# Patient Record
Sex: MALE | Race: BLACK OR AFRICAN AMERICAN | URBAN - METROPOLITAN AREA
[De-identification: names, ages, dates, MRNs, and addresses within clinical notes are randomized per-mention and may not be internally consistent; named-entity substitution may affect disease eponyms.]

---

## 2019-05-09 ENCOUNTER — HISTORICAL (OUTPATIENT)
Dept: CARDIOLOGY | Facility: HOSPITAL | Age: 79
End: 2019-05-09

## 2019-05-09 LAB
ABS NEUT (OLG): 4.34 X10(3)/MCL (ref 2.1–9.2)
BASOPHILS # BLD AUTO: 0.1 X10(3)/MCL (ref 0–0.2)
BASOPHILS NFR BLD AUTO: 1 %
BNP BLD-MCNC: 164 PG/ML (ref 0–125)
BUN SERPL-MCNC: 19 MG/DL (ref 7–18)
CALCIUM SERPL-MCNC: 9.4 MG/DL (ref 8.5–10.1)
CHLORIDE SERPL-SCNC: 106 MMOL/L (ref 98–107)
CO2 SERPL-SCNC: 29 MMOL/L (ref 21–32)
CREAT SERPL-MCNC: 1.27 MG/DL (ref 0.7–1.3)
CREAT/UREA NIT SERPL: 15
EOSINOPHIL # BLD AUTO: 0.3 X10(3)/MCL (ref 0–0.9)
EOSINOPHIL NFR BLD AUTO: 4 %
ERYTHROCYTE [DISTWIDTH] IN BLOOD BY AUTOMATED COUNT: 14.6 % (ref 11.5–17)
GLUCOSE SERPL-MCNC: 109 MG/DL (ref 74–106)
HCT VFR BLD AUTO: 39.6 % (ref 42–52)
HGB BLD-MCNC: 11.9 GM/DL (ref 14–18)
LYMPHOCYTES # BLD AUTO: 2.1 X10(3)/MCL (ref 0.6–4.6)
LYMPHOCYTES NFR BLD AUTO: 27 %
MCH RBC QN AUTO: 26.5 PG (ref 27–31)
MCHC RBC AUTO-ENTMCNC: 30.1 GM/DL (ref 33–36)
MCV RBC AUTO: 88.2 FL (ref 80–94)
MONOCYTES # BLD AUTO: 0.9 X10(3)/MCL (ref 0.1–1.3)
MONOCYTES NFR BLD AUTO: 11 %
NEUTROPHILS # BLD AUTO: 4.34 X10(3)/MCL (ref 2.1–9.2)
NEUTROPHILS NFR BLD AUTO: 57 %
PLATELET # BLD AUTO: 255 X10(3)/MCL (ref 130–400)
PMV BLD AUTO: 12.6 FL (ref 9.4–12.4)
POTASSIUM SERPL-SCNC: 3.8 MMOL/L (ref 3.5–5.1)
RBC # BLD AUTO: 4.49 X10(6)/MCL (ref 4.7–6.1)
SODIUM SERPL-SCNC: 140 MMOL/L (ref 136–145)
WBC # SPEC AUTO: 7.7 X10(3)/MCL (ref 4.5–11.5)

## 2019-05-25 ENCOUNTER — HOSPITAL ENCOUNTER (OUTPATIENT)
Dept: MEDSURG UNIT | Facility: HOSPITAL | Age: 79
End: 2019-05-27
Attending: INTERNAL MEDICINE | Admitting: INTERNAL MEDICINE

## 2019-05-25 LAB
ABS NEUT (OLG): 5.53 X10(3)/MCL (ref 2.1–9.2)
ALBUMIN SERPL-MCNC: 3.7 GM/DL (ref 3.4–5)
ALBUMIN/GLOB SERPL: 0.9 RATIO (ref 1.1–2)
ALP SERPL-CCNC: 79 UNIT/L (ref 50–136)
ALT SERPL-CCNC: 21 UNIT/L (ref 12–78)
APPEARANCE, UA: CLEAR
AST SERPL-CCNC: 54 UNIT/L (ref 15–37)
BACTERIA SPEC CULT: ABNORMAL /HPF
BASOPHILS # BLD AUTO: 0 X10(3)/MCL (ref 0–0.2)
BASOPHILS NFR BLD AUTO: 0 %
BILIRUB SERPL-MCNC: 0.6 MG/DL (ref 0.2–1)
BILIRUB UR QL STRIP: NEGATIVE
BILIRUBIN DIRECT+TOT PNL SERPL-MCNC: 0.2 MG/DL (ref 0–0.5)
BILIRUBIN DIRECT+TOT PNL SERPL-MCNC: 0.4 MG/DL (ref 0–0.8)
BUN SERPL-MCNC: 26 MG/DL (ref 7–18)
CALCIUM SERPL-MCNC: 9.5 MG/DL (ref 8.5–10.1)
CHLORIDE SERPL-SCNC: 106 MMOL/L (ref 98–107)
CK SERPL-CCNC: 875 UNIT/L (ref 39–308)
CO2 SERPL-SCNC: 31 MMOL/L (ref 21–32)
COLOR UR: YELLOW
CREAT SERPL-MCNC: 1.77 MG/DL (ref 0.7–1.3)
EOSINOPHIL # BLD AUTO: 0.1 X10(3)/MCL (ref 0–0.9)
EOSINOPHIL NFR BLD AUTO: 1 %
ERYTHROCYTE [DISTWIDTH] IN BLOOD BY AUTOMATED COUNT: 14.7 % (ref 11.5–17)
GLOBULIN SER-MCNC: 4.1 GM/DL (ref 2.4–3.5)
GLUCOSE (UA): NEGATIVE
GLUCOSE SERPL-MCNC: 86 MG/DL (ref 74–106)
HCT VFR BLD AUTO: 39.8 % (ref 42–52)
HGB BLD-MCNC: 12.3 GM/DL (ref 14–18)
HGB UR QL STRIP: NEGATIVE
KETONES UR QL STRIP: ABNORMAL
LEUKOCYTE ESTERASE UR QL STRIP: NEGATIVE
LYMPHOCYTES # BLD AUTO: 1.6 X10(3)/MCL (ref 0.6–4.6)
LYMPHOCYTES NFR BLD AUTO: 19 %
MCH RBC QN AUTO: 26.5 PG (ref 27–31)
MCHC RBC AUTO-ENTMCNC: 30.9 GM/DL (ref 33–36)
MCV RBC AUTO: 85.8 FL (ref 80–94)
MONOCYTES # BLD AUTO: 1.1 X10(3)/MCL (ref 0.1–1.3)
MONOCYTES NFR BLD AUTO: 13 %
NEUTROPHILS # BLD AUTO: 5.53 X10(3)/MCL (ref 2.1–9.2)
NEUTROPHILS NFR BLD AUTO: 66 %
NITRITE UR QL STRIP: NEGATIVE
PH UR STRIP: 5.5 [PH] (ref 5–9)
PLATELET # BLD AUTO: 245 X10(3)/MCL (ref 130–400)
PMV BLD AUTO: 12 FL (ref 9.4–12.4)
POTASSIUM SERPL-SCNC: 3.5 MMOL/L (ref 3.5–5.1)
PROT SERPL-MCNC: 7.8 GM/DL (ref 6.4–8.2)
PROT UR QL STRIP: NEGATIVE
RBC # BLD AUTO: 4.64 X10(6)/MCL (ref 4.7–6.1)
RBC #/AREA URNS HPF: ABNORMAL /[HPF]
SODIUM SERPL-SCNC: 142 MMOL/L (ref 136–145)
SP GR UR STRIP: 1.01 (ref 1–1.03)
SQUAMOUS EPITHELIAL, UA: ABNORMAL
UROBILINOGEN UR STRIP-ACNC: 0.2
WBC # SPEC AUTO: 8.4 X10(3)/MCL (ref 4.5–11.5)
WBC #/AREA URNS HPF: ABNORMAL /HPF

## 2019-05-26 LAB
ALBUMIN SERPL-MCNC: 3.4 GM/DL (ref 3.4–5)
ALBUMIN/GLOB SERPL: 0.9 RATIO (ref 1.1–2)
ALP SERPL-CCNC: 76 UNIT/L (ref 50–136)
ALT SERPL-CCNC: 19 UNIT/L (ref 12–78)
AST SERPL-CCNC: 42 UNIT/L (ref 15–37)
BILIRUB SERPL-MCNC: 0.7 MG/DL (ref 0.2–1)
BILIRUBIN DIRECT+TOT PNL SERPL-MCNC: 0.2 MG/DL (ref 0–0.5)
BILIRUBIN DIRECT+TOT PNL SERPL-MCNC: 0.5 MG/DL (ref 0–0.8)
BUN SERPL-MCNC: 24 MG/DL (ref 7–18)
CALCIUM SERPL-MCNC: 8.5 MG/DL (ref 8.5–10.1)
CHLORIDE SERPL-SCNC: 103 MMOL/L (ref 98–107)
CK SERPL-CCNC: 591 UNIT/L (ref 39–308)
CO2 SERPL-SCNC: 32 MMOL/L (ref 21–32)
CREAT SERPL-MCNC: 1.67 MG/DL (ref 0.7–1.3)
ERYTHROCYTE [DISTWIDTH] IN BLOOD BY AUTOMATED COUNT: 14.8 % (ref 11.5–17)
GLOBULIN SER-MCNC: 3.7 GM/DL (ref 2.4–3.5)
GLUCOSE SERPL-MCNC: 225 MG/DL (ref 74–106)
HCT VFR BLD AUTO: 37.8 % (ref 42–52)
HGB BLD-MCNC: 11.8 GM/DL (ref 14–18)
MCH RBC QN AUTO: 27 PG (ref 27–31)
MCHC RBC AUTO-ENTMCNC: 31.2 GM/DL (ref 33–36)
MCV RBC AUTO: 86.5 FL (ref 80–94)
PLATELET # BLD AUTO: 217 X10(3)/MCL (ref 130–400)
PMV BLD AUTO: 13 FL (ref 9.4–12.4)
POTASSIUM SERPL-SCNC: 3.5 MMOL/L (ref 3.5–5.1)
PROT SERPL-MCNC: 7.1 GM/DL (ref 6.4–8.2)
RBC # BLD AUTO: 4.37 X10(6)/MCL (ref 4.7–6.1)
SODIUM SERPL-SCNC: 140 MMOL/L (ref 136–145)
WBC # SPEC AUTO: 7 X10(3)/MCL (ref 4.5–11.5)

## 2019-05-27 LAB
BUN SERPL-MCNC: 21 MG/DL (ref 7–18)
CALCIUM SERPL-MCNC: 8.6 MG/DL (ref 8.5–10.1)
CHLORIDE SERPL-SCNC: 104 MMOL/L (ref 98–107)
CO2 SERPL-SCNC: 33 MMOL/L (ref 21–32)
CREAT SERPL-MCNC: 1.52 MG/DL (ref 0.7–1.3)
CREAT/UREA NIT SERPL: 13.8
GLUCOSE SERPL-MCNC: 197 MG/DL (ref 74–106)
POTASSIUM SERPL-SCNC: 3.1 MMOL/L (ref 3.5–5.1)
SODIUM SERPL-SCNC: 141 MMOL/L (ref 136–145)

## 2022-04-30 NOTE — H&P
Patient:   Manoj Lopez             MRN: [id number]            FIN: [id number]               Age:   79 years     Sex:  Male     :  1940   Associated Diagnoses:   None   Author:   Lesvia John MD      Basic Information   Source of history:  Self, Family member, Medical record.    Present at bedside:  Medical personnel.    Referral source:  Emergency department.    History limitation:  None.    Provider information/ cc:    PCP: FAM CHENEY  ADVANCED DIRECTIVES :NONE  CODE STATUS: FULL.       Chief Complaint   2019 19:14 CDT      states fell today and could not get up/ states was 7 hours before anyone could come help him/ hx of dm, cbg 93 in triage/ denies hitting head, denies blood thinners, states has no pain      History of Present Illness   79-year-old -American male presents to the ED brought in by family status post fall at home in the bathroom with patient down for about 7 hours until family returned home and found him.  Patient denies any loss of consciousness, denies any head injury.  He reports he lost his footing and balance in the bathroom and fell and was unable to get up.  He reports his daughter found him after several hours which she assisted patient up and he presented to the ED for further evaluation.  Patient denies any anticoagulation therapy or blood thinners.  He denies any chest pain or shortness of breath, no reports of nausea, vomiting, diarrhea, fever, chills, or any sick contacts.  Labwork done revealed BUN 26.0, creatinine 1.77; , H&H 12.3/39.8, platelets 245; other indices unremarkable.  CT of the head without contrast revealed chronic ischemic disease without acute intracranial abnormality.  Patient denies any injury or trauma. Patient did have some mild tachycardia on presentation in the ED with heart rate 107 which subsequently trended down.  Patient is admitted to hospital medicine services for further management.  VS /80 pulse 97  respirations 20 temperature 37.3°C O2 saturation 96% on room air      Review of Systems   Except as document, all other systems reviewed and negative      Health Status   Allergies:    Allergic Reactions (Selected)  No Known Allergies   Current medications:  (Selected)   Inpatient Medications  Ordered  Dextrose 50% and Water  (50 mL vial/syringe): 25 mL, 12.5 gm =, form: Injection, IV Push, As Directed PRN for blood glucose, Infuse over: 5 minute(s), first dose 05/26/19 0:27:00 CDT, Unconscious patient: Repeat as ordered per protocol.  Dextrose 50% and Water  (50 mL vial/syringe): 25 mL, 12.5 gm =, form: Injection, IV Push, Once PRN for blood glucose, Infuse over: 5 minute(s), first dose 05/26/19 0:27:00 CDT, Unconscious patient: Look for other source of altered mental status.  Dextrose 50% and Water  (50 mL vial/syringe): 50 mL, 25 gm =, form: Injection, IV Push, As Directed PRN for blood glucose, Infuse over: 5 minute(s), first dose 05/26/19 0:27:00 CDT, Unconscious patient: Repeat as ordered per protocol.  Dextrose 50% in Water intravenous solution: 25 mL, 12.5 gm =, form: Injection, IV Push, As Directed PRN for blood glucose, Infuse over: 5 minute(s), first dose 05/26/19 0:27:00 CDT, Conscious patient.  NS (0.9% Sodium Chloride) Infusion 1,000 mL: 1,000 mL, 1,000 mL, IV, 1,000 mL/hr, start date 05/25/19 20:38:00 CDT  Zofran: 4 mg, form: Injection, IV Push, q4hr PRN for nausea, first dose 05/26/19 0:27:00 CDT, choose first if ordered with other treatments for nausea  acetaminophen: 1,000 mg, form: Tab, Oral, q6hr PRN for pain, first dose 05/26/19 0:27:00 CDT, mild/moderate  glucagon: 1 mg, form: Injection, IM, q10min PRN for blood glucose, first dose 05/26/19 0:27:00 CDT, Conscious Patient with NO IV access available and BG < 45 mg/dl.  glucagon: 1 mg, form: Injection, IM, q10min PRN for blood glucose, first dose 05/26/19 0:27:00 CDT, Unconscious patient: Patient with NO IV access available and BG < 70  mg/dl.  insulin lispro: 2-14 units, form: Soln, Subcutaneous, As Directed PRN for blood glucose, first dose 05/26/19 0:27:00 CDT  Documented Medications  Documented  Entresto 49 mg-51 mg oral tablet: 1 tab(s), Oral, BID, # 60 tab(s), 0 Refill(s)  NovoLIN 70/30 FlexPen subcutaneous suspension: Subcutaneous, BID, 39 units in AM and 56 units in PM  Proscar 5 mg oral tablet: 5 mg = 1 tab(s), Oral, Daily, # 30 tab(s), 0 Refill(s)  Travatan Z 0.004% ophthalmic solution: 1 drop(s), Eye-Both, BID  aspirin 325 mg oral Delayed Release (EC) tablet: 325 mg = 1 tab(s), Oral, Daily, # 30 tab(s), 0 Refill(s)  aspirin 81 mg oral Delayed Release (EC) tablet: 81 mg = 1 tab(s), Oral, Daily, # 30 tab(s), 0 Refill(s)  carvedilol 6.25 mg oral tablet: 6.25 mg = 1 tab(s), Oral, BID  furosemide 40 mg oral tablet: 40 mg = 1 tab(s), Oral, Daily  hydrochlorothiazide 25 mg oral tablet: 25 mg = 1 tab(s), Oral, Daily  lisinopril 20 mg oral tablet: 20 mg = 1 tab(s), Oral, Daily  metFORMIN 1000 mg oral tablet: 1000 mg = 1 tab(s), Oral, BID  potassium chloride 10 mEq oral CAPSULE extended release: 10 mEq = 1 cap(s), Oral, Daily  rosuvastatin 10 mg oral tablet: 10 mg = 1 tab(s), Oral, qPM  torsemide 10 mg oral tablet: 10 mg = 1 tab(s), Oral, Daily  torsemide 20 mg oral tablet: 40 mg = 2 tab(s), Oral, qAM      Histories   Past Medical History:   DM, HTN, HLD, glaucoma, CHF, cardiomyopathy, CHANDRIKA   Family History:   Mother- cardiac history   Procedure history: Angiogram   Social History     Denies any alcohol use  Denies any illicit drug use  Denies any tobacco use  Lives with family.        Physical Examination      Vital Signs (last 24 hrs)_____  Last Charted___________  Temp Oral     37.3 DegC  (MAY 26 00:51)  Heart Rate Peripheral   97 bpm  (MAY 26 00:51)  Resp Rate         20 br/min  (MAY 25 23:31)  SBP      H 149mmHg  (MAY 26 00:51)  DBP      80 mmHg  (MAY 26 00:51)  SpO2      96 %  (MAY 26 00:51)   General:  Alert and oriented, No acute distress,  Elderly male chronically ill-appearing; nontoxic.    Eye:  Pupils are equal, round and reactive to light, Extraocular movements are intact, Vision unchanged.    HENT:  Normocephalic, Oral mucosa is moist, No pharyngeal erythema.    Neck:  Supple, Non-tender.    Respiratory:  Lungs are clear to auscultation, Respirations are non-labored, Breath sounds are equal, Symmetrical chest wall expansion.    Cardiovascular:  Normal rate, Regular rhythm, S1, S2, No gallop, Edema lower extremities, systolic murmur.         Capillary refill: Less than 2 seconds.    Gastrointestinal:  Soft, Non-tender, Non-distended, Normal bowel sounds.    Genitourinary:  No costovertebral angle tenderness.    Musculoskeletal:  Moves upper and lower extremities freely and on command; generalized weakness.    Integumentary:  Warm, Dry, Pink.    Neurologic:  Alert, Oriented, Normal sensory, Normal motor function, No focal deficits, Cranial Nerves II-XII are grossly intact.    Psychiatric:  Cooperative, Appropriate mood & affect.    Cognition and Speech:  Speech clear and coherent.       Review / Management   Results review:     Labs (Last four charted values)  WBC                  8.4 (MAY 25)   Hgb                  L 12.3 (MAY 25)   Hct                  L 39.8 (MAY 25)   Plt                  245 (MAY 25)   Na                   142 (MAY 25)   K                    3.5 (MAY 25)   CO2                  31.0 (MAY 25)   Cl                   106 (MAY 25)   Cr                   H 1.77 (MAY 25)   BUN                  H 26.0 (MAY 25)   Glucose Random       86 (MAY 25) .    Laboratory Results   Today's Lab Results : PowerNote Discrete Results   5/25/2019 21:22 CDT      POC CBG                   81 mg/dL    5/25/2019 19:30 CDT      WBC                       8.4 x10(3)/mcL                             RBC                       4.64 x10(6)/mcL  LOW                             Hgb                       12.3 gm/dL  LOW                             Hct                        39.8 %  LOW                             Platelet                  245 x10(3)/mcL                             MCV                       85.8 fL                             MCH                       26.5 pg  LOW                             MCHC                      30.9 gm/dL  LOW                             RDW                       14.7 %                             MPV                       12.0 fL                             Abs Neut                  5.53 x10(3)/mcL                             Neutro Auto               66 %  NA                             Lymph Auto                19 %  NA                             Mono Auto                 13 %  NA                             Eos Auto                  1 %  NA                             Abs Eos                   0.1 x10(3)/mcL                             Basophil Auto             0 %  NA                             Abs Neutro                5.53 x10(3)/mcL                             Abs Lymph                 1.6 x10(3)/mcL                             Abs Mono                  1.1 x10(3)/mcL                             Abs Baso                  0.0 x10(3)/mcL                             UA Appear                 CLEAR                             UA Color                  YELLOW                             UA Spec Grav              1.014                             UA Bili                   Negative                             UA pH                     5.5                             UA Urobilinogen           0.2                             UA Blood                  Negative                             UA Glucose                Negative                             UA Ketones                Trace                             UA Protein                Negative                             UA Nitrite                Negative                             UA Leuk Est               Negative                             UA WBC                    NONE SEEN /HPF                              UA RBC                    NONE SEEN                             UA Bacteria               NONE SEEN /HPF                             UA Squam Epithelial       NONE SEEN                             Sodium Lvl                142 mmol/L                             Potassium Lvl             3.5 mmol/L                             Chloride                  106 mmol/L                             CO2                       31.0 mmol/L                             Calcium Lvl               9.5 mg/dL                             Glucose Lvl               86 mg/dL                             BUN                       26.0 mg/dL  HI                             Creatinine                1.77 mg/dL  HI                             eGFR-AA                   48 mL/min/1.73 m2  NA                             eGFR-BETH                  40 mL/min/1.73 m2  NA                             Bili Total                0.6 mg/dL                             Bili Direct               0.20 mg/dL                             Bili Indirect             0.40 mg/dL                             AST                       54 unit/L  HI                             ALT                       21 unit/L                             Alk Phos                  79 unit/L                             Total Protein             7.8 gm/dL                             Albumin Lvl               3.70 gm/dL                             Globulin                  4.10 gm/dL  HI                             A/G Ratio                 0.9 ratio  LOW                             Total CK                  875 unit/L  HI    5/25/2019 19:16 CDT      POC CBG                   93 mg/dL        Radiology results   Reviewed radiologist's report,   CT Head without contrast  Reason For Exam  Headaches    Radiology Report  History: Headache.     Exam: Noncontrast CT of the head.  DLP: 1004 mGycm     Comparison: CT of the head 6/21/2007     FINDINGS: There is normal brain  formation. There is normal gray-white  matter differentiation. There is no hemorrhage. No hydrocephalus.  There is diffuse cerebral atrophy. There is periventricular and  cortical hypodensity. There is no herniation. There is no intra or  extra-axial fluid collection. There are coarse calcifications in the  bilateral carotid siphons.     The calvarium is intact. There is no fracture. The orbits are normal.  The paranasal and air cells are normally developed and well aerated.     IMPRESSION: Sequela of chronic ischemic disease without acute  intracranial abnormality.       Signature Line  Electronically Signed By: Tal Plata MD  Date/Time Signed: 05/25/2019 23:02     Documentation reviewed:  Reviewed prior records, Reviewed home medications.    Condition:  Fair.       Impression and Plan   Diagnosis       IMPRESSION:  Rhabdomylosis  IVVD with dehydration  ORQUIDEA  Fall- ground level  CHF- EF 30-35% (4/2019)  CMO  HTN  Weakness    PLAN:  Pt received hydration in the ED, will hold off on any further wtih his tory of CHF and EF of 30-35%. His ORQUIDEA and elevated CPK secondary to being down for several hours a mal.  Accurate I&O. Consult therapy services. Repeat labs in the am. Resume home medication as deemed necessary. Accuchecks with sliding scale. GI/DVT prophylaxis.      FULL CODE STATUS  PCP: [facility] CHENEY I, Robe RICHARDSON, FNP, discussed the case with FREDY John   .     Education and Follow-up:       Counseled: Patient, Family, Regarding diagnosis, Regarding treatment, Regarding medications.       Professional Services   Lesvia FLORES MD, reviewed the NP documentation, treatment plan, and medical decision making; and I had face-to-face time with this patient.  Labs and imaging were reviewed and I agree with history, physical and medical decision making as detailed above.     In summary, 79 years old male with medical history of cardiomyopathy EF 35% presented to the ER after a fall in the bathroom  tub after feeling lightheaded without loss of consciousness.  He remained in the floor for few hours as he was too weak and unable to stand up. report he has been feeling weak over the last 2-week.  1 week ago he had 3 to 4 days history of watery diarrhea that resolved.  1 week ago his cardiologist changed his diuretics from furosemide to torsemide 40 mg  BID.  Report frequent urination since.  On physical exam: RRR, lungs clear, alert oriented x3, no focal neurological deficit, +1 pedal edema, left anterior shin healing blister.     Impression:  -Near-syncope secondary to intravascular volume depletion (with history of increased diuresis and prior history of diarrhea)  -ORQUIDEA  -Elevated CK without rhabdo    Received 1 L of IV fluids.  Will hold off diuretics for now.. Will need reduced dose of torsemide prior to discharge. Physical therapy.

## 2022-04-30 NOTE — ED PROVIDER NOTES
Patient:   Manoj Lopez             MRN: [id number]            FIN: [id number]               Age:   79 years     Sex:  Male     :  1940   Associated Diagnoses:   Fall; Dehydration; Elevated CK; Rhabdomyolysis   Author:   Kem Aldridge      Basic Information   Time seen: Date & time 2019 19:14:00.   History source: Patient, family.   Arrival mode: Private vehicle, wheelchair.   History limitation: None.      History of Present Illness   The patient presents following fall and Patient states that today he felt weak and fell at home and was too weak to get up. states that he was down on the ground x seven hours until his family found him. denies any LOC or hitting his head. denies any pain. states that he has generalized weakness (isatu, NP)..  The onset was 7  hours ago.  The occurrence was single episode.  The fall was described as lost balance.  The location where the incident occurred was at home.  The degree at present is none.  The exacerbating factor is none.  The relieving factor is none.  Risk factors consist of age.  Therapy today: none.  Preceding symptoms none.  Associated symptoms: none.  Additional history: none.        Review of Systems   Constitutional symptoms:  No fever, no chills.    Respiratory symptoms:  No shortness of breath, no cough.    Cardiovascular symptoms:  No chest pain, no palpitations.    Gastrointestinal symptoms:  No vomiting, no diarrhea.    Musculoskeletal symptoms:  No back pain,    Neurologic symptoms:  No altered level of consciousness, no weakness.              Additional review of systems information: All other systems reviewed and otherwise negative.      Health Status   Allergies:    Allergic Reactions (Selected)  No Known Allergies,    Allergies (1) Active Reaction  No Known Allergies None Documented.   Medications:  (Selected)   Documented Medications  Documented  Entresto 49 mg-51 mg oral tablet: 1 tab(s), Oral, BID, # 60 tab(s), 0  Refill(s)  NovoLIN 70/30 FlexPen subcutaneous suspension: Subcutaneous, BID, 39 units in AM and 56 units in PM  Proscar 5 mg oral tablet: 5 mg = 1 tab(s), Oral, Daily, # 30 tab(s), 0 Refill(s)  aspirin 325 mg oral Delayed Release (EC) tablet: 325 mg = 1 tab(s), Oral, Daily, # 30 tab(s), 0 Refill(s)  carvedilol 6.25 mg oral tablet: 6.25 mg = 1 tab(s), Oral, BID  furosemide 40 mg oral tablet: 40 mg = 1 tab(s), Oral, Daily  metFORMIN 1000 mg oral tablet: 1000 mg = 1 tab(s), Oral, BID  potassium chloride 10 mEq oral CAPSULE extended release: 10 mEq = 1 cap(s), Oral, Daily, per nurse's notes.   Immunizations: Per nurse's notes.      Past Medical/ Family/ Social History   Medical history:    Active  Hypertension ([id number])  Diabetes mellitus ([id number]), Reviewed as documented in chart.   Surgical history:    No active procedure history items have been selected or recorded., Reviewed as documented in chart.   Family history:    No family history items have been selected or recorded., Reviewed as documented in chart.   Social history: Reviewed as documented in chart.   Problem list: Per nurse's notes.      Physical Examination   Debi coma scale:  Total score: Total score: 15.   Neurological:  Alert and oriented to person, place, time, and situation, No focal neurological deficit observed, normal sensory observed, normal motor observed, normal speech observed, normal coordination observed, Gait: Normal.    General:  Alert, no acute distress, well hydrated, Skin: Normal for ethnicity.    Skin:  Warm, dry, pink, intact.    Head:  Normocephalic.   Neck:  Supple, no tenderness, full range of motion.    Eye:  Pupils are equal, round and reactive to light, extraocular movements are intact, normal conjunctiva.    Ears, nose, mouth and throat:  Oral mucosa moist.   Cardiovascular:  Regular rate and rhythm, No murmur, Normal peripheral perfusion.    Respiratory:  Lungs are clear to auscultation, breath sounds are equal,  Respirations: not tachypneic, not labored, not shallow, Retractions: None.    Chest wall:  No tenderness.   Back:  Normal range of motion, Normal alignment, No costovertebral angle tenderness,    Musculoskeletal:  Normal ROM, normal strength, no swelling, no deformity.    Gastrointestinal:  Soft, Nontender, Non distended, Normal bowel sounds.    Psychiatric:  Cooperative, appropriate mood & affect, normal judgment.       Medical Decision Making   Rationale:  Patient was given fluids and told to see his pcp monday. His family was here and he and his family understand the importance of fluid intake. .   Documents reviewed:  Emergency department nurses' notes.   Orders  Launch Orders   Laboratory:  CK (Order): Stat collect, 5/25/2019 20:28 CDT, Blood, Lab Collect, Print Label By Order Location, 5/25/2019 20:28 CDT, Launch Orders   Patient Care:  Saline Lock Insert (Order): 5/25/2019 20:38 CDT  Pharmacy:  [facility] (0.9% Sodium Chloride) Infusion 1000 mL (Order): 1,000 mL, 1,000 mL, IV, 1,000 mL/hr, start date 5/25/2019 20:38 CDT, Launch Orders   Radiology:  CT Head W/O Contrast (Order): Stat, 5/25/2019 21:37 CDT, Headaches, None, Patient Bed, Patient Has IV?, Rad Type, Schedule this test.    Results review:  Lab results : Lab View   5/25/2019 19:30 CDT      Sodium Lvl                142 mmol/L                             Potassium Lvl             3.5 mmol/L                             Chloride                  106 mmol/L                             CO2                       31.0 mmol/L                             Calcium Lvl               9.5 mg/dL                             Glucose Lvl               86 mg/dL                             BUN                       26.0 mg/dL  HI                             Creatinine                1.77 mg/dL  HI                             eGFR-AA                   48 mL/min/1.73 m2  NA                             eGFR-BETH                  40 mL/min/1.73 m2  NA                              Bili Total                0.6 mg/dL                             Bili Direct               0.20 mg/dL                             Bili Indirect             0.40 mg/dL                             AST                       54 unit/L  HI                             ALT                       21 unit/L                             Alk Phos                  79 unit/L                             Total Protein             7.8 gm/dL                             Albumin Lvl               3.70 gm/dL                             Globulin                  4.10 gm/dL  HI                             A/G Ratio                 0.9 ratio  LOW                             Total CK                  875 unit/L  HI                             WBC                       8.4 x10(3)/mcL                             RBC                       4.64 x10(6)/mcL  LOW                             Hgb                       12.3 gm/dL  LOW                             Hct                       39.8 %  LOW                             Platelet                  245 x10(3)/mcL                             MCV                       85.8 fL                             MCH                       26.5 pg  LOW                             MCHC                      30.9 gm/dL  LOW                             RDW                       14.7 %                             MPV                       12.0 fL                             Abs Neut                  5.53 x10(3)/mcL                             Neutro Auto               66 %  NA                             Lymph Auto                19 %  NA                             Mono Auto                 13 %  NA                             Eos Auto                  1 %  NA                             Abs Eos                   0.1 x10(3)/mcL                             Basophil Auto             0 %  NA                             Abs Neutro                5.53 x10(3)/mcL                             Abs Lymph                 1.6  x10(3)/mcL                             Abs Mono                  1.1 x10(3)/mcL                             Abs Baso                  0.0 x10(3)/mcL                             UA Appear                 CLEAR                             UA Color                  YELLOW                             UA Spec Grav              1.014                             UA Bili                   Negative                             UA pH                     5.5                             UA Urobilinogen           0.2                             UA Blood                  Negative                             UA Glucose                Negative                             UA Ketones                Trace                             UA Protein                Negative                             UA Nitrite                Negative                             UA Leuk Est               Negative                             UA WBC                    NONE SEEN /HPF                             UA RBC                    NONE SEEN                             UA Bacteria               NONE SEEN /HPF                             UA Squam Epithelial       NONE SEEN  .      Reexamination/ Reevaluation   Time: 5/25/2019 21:35:00 .   Course: Began to complain of a headache. CT head ordered.      Impression and Plan   Diagnosis   Fall (NYY00-AD W19.XXXA)   Dehydration (PEK78-OC E86.0)   Elevated CK (RXU51-BP R74.8)   Rhabdomyolysis (VSY12-LE M62.82)      Calls-Consults   -  5/25/2019 22:55:00 , Lesvia John MD, recommends [facility] accepts patient .    Plan   Condition: Improved, Stable.    Disposition: Admit time  5/25/2019 22:45:00, Place in Observation Unit.    Follow up with   Counseled: Patient, Regarding diagnosis, Regarding diagnostic results, Regarding treatment plan.    Orders: Launch Orders   Admit/Transfer/Discharge:  Admit to Outpatient Observation (Order): 5/25/2019 23:01 CDT, Medical Unit Lesvia John MD, No.

## 2023-01-23 NOTE — DISCHARGE SUMMARY
Patient:   Manoj Lopez             MRN: [id number]            FIN: [id number]               Age:   79 years     Sex:  Male     :  1940   Associated Diagnoses:   None   Author:   Karthik Gudino MD, DC to home today, 19    renal functions have improved    Will reduce home Torsemide to 20 mg po daily    Hold Home metformin. Cont home insulin regimen     Please see progress note done today   for HPI, Physical exam and tx plan    Dc 31 mts     no